# Patient Record
(demographics unavailable — no encounter records)

---

## 2024-10-10 NOTE — CONSULT LETTER
[FreeTextEntry1] : Dear Dr. OLIMPIA ESCOBAR ,  I had the pleasure of seeing  AUGUSTA MCKEON for follow up today.  Below is my note regarding the office visit today.  Thank you so very much for allowing me to participate in AUGUSTA's  care.  Please don't hesitate to call me should any questions or issues arise .  Sincerely,   Sami Linton MD, FACS, FSPU Chief, Pediatric Urology Professor of Urology and Pediatrics [facility] [employer]  President, [employer] - [place] Section Past-President, Societies for Pediatric Urology

## 2024-10-10 NOTE — HISTORY OF PRESENT ILLNESS
[TextBox_4] : AUGUSTA is here for follow up of his Grade 2 left varicocele.  Since the last visit, he reports that he has not detected an increase in size of the varicocele or the testes.  There is no reported pain or redness, even with physical activity

## 2024-10-10 NOTE — CONSULT LETTER
[FreeTextEntry1] : Dear Dr. OLIMPIA ESCOBAR ,  I had the pleasure of seeing  AUGUSTA MCKEON for follow up today.  Below is my note regarding the office visit today.  Thank you so very much for allowing me to participate in AUGUSTA's  care.  Please don't hesitate to call me should any questions or issues arise .  Sincerely,   Sami Linton MD, FACS, FSPU Chief, Pediatric Urology Professor of Urology and Pediatrics [facility] [employer]  President, [employer] - [place] Section Past-President, Societies for Pediatric U[employer]

## 2024-10-10 NOTE — ASSESSMENT
[FreeTextEntry1] : AUGUSTA has a left sided varicocele.  I had a discussion regarding the etiology and natural history of varicoceles.  We also discussed the possible effect of varicoceles on testicular growth that may have a negative effect on fertility.  We discussed the indications for surgery and many surgical aspects. Currently there is no surgical indication.  The only parameter not to be evaluated is a semen analysis; however, it is premature to obtain this study.  My recommendation is to observe the varicocele and to follow up in 12 months for another examination and scrotal ultrasound. All questions were answered.

## 2024-10-10 NOTE — DATA REVIEWED
[FreeTextEntry1] : EXAMINATION: US SCROTUM   DOS TODAYS VISIT   FINDINGS: LEFT VARICOCELE WITH SYMMETRIC TESTES WITH NORMAL FLOW; UNREMARKABLE OTHER SCROTAL CONTENTS